# Patient Record
Sex: MALE | Race: WHITE | NOT HISPANIC OR LATINO | Employment: OTHER | URBAN - NONMETROPOLITAN AREA
[De-identification: names, ages, dates, MRNs, and addresses within clinical notes are randomized per-mention and may not be internally consistent; named-entity substitution may affect disease eponyms.]

---

## 2017-01-04 ENCOUNTER — OFFICE VISIT (OUTPATIENT)
Dept: FAMILY MEDICINE CLINIC | Facility: CLINIC | Age: 62
End: 2017-01-04

## 2017-01-04 VITALS
OXYGEN SATURATION: 96 % | DIASTOLIC BLOOD PRESSURE: 68 MMHG | HEART RATE: 101 BPM | WEIGHT: 243 LBS | SYSTOLIC BLOOD PRESSURE: 128 MMHG | HEIGHT: 69 IN | BODY MASS INDEX: 35.99 KG/M2

## 2017-01-04 DIAGNOSIS — J30.2 SEASONAL ALLERGIC RHINITIS, UNSPECIFIED ALLERGIC RHINITIS TRIGGER: ICD-10-CM

## 2017-01-04 DIAGNOSIS — F41.9 ANXIETY: Primary | ICD-10-CM

## 2017-01-04 PROCEDURE — 99214 OFFICE O/P EST MOD 30 MIN: CPT | Performed by: FAMILY MEDICINE

## 2017-01-04 RX ORDER — CITALOPRAM 20 MG/1
20 TABLET ORAL DAILY
Qty: 90 TABLET | Refills: 3 | Status: SHIPPED | OUTPATIENT
Start: 2017-01-04

## 2017-01-04 NOTE — MR AVS SNAPSHOT
Bryce Stiles   1/4/2017 11:00 AM   Office Visit    Dept Phone:  [phone]   Encounter #:  [id number]    Provider:  Samson Llanes MD   Department:  [facility] FAMILY MEDICINE                Your Full Care Plan              Today's Medication Changes          These changes are accurate as of: 1/4/17 11:51 AM.  If you have any questions, ask your nurse or doctor.               Medication(s)that have changed:     citalopram 20 MG tablet   Commonly known as:  CELEXA   Take 1 tablet by mouth Daily.   What changed:    - medication strength  - how much to take   Changed by:  Samson Llanes MD            Where to Get Your Medications      You can get these medications from any pharmacy     Bring a paper prescription for each of these medications     citalopram 20 MG tablet                  Your Updated Medication List          This list is accurate as of: 1/4/17 11:51 AM.  Always use your most recent med list.                albuterol 108 (90 BASE) MCG/ACT inhaler   Commonly known as:  PROVENTIL HFA;VENTOLIN HFA       amLODIPine 5 MG tablet   Commonly known as:  NORVASC       citalopram 20 MG tablet   Commonly known as:  CELEXA   Take 1 tablet by mouth Daily.       fexofenadine 180 MG tablet   Commonly known as:  ALLEGRA       fluticasone-salmeterol 250-50 MCG/DOSE DISKUS   Commonly known as:  ADVAIR       glimepiride 4 MG tablet   Commonly known as:  AMARYL       lisinopril-hydrochlorothiazide 20-12.5 MG per tablet   Commonly known as:  PRINZIDE,ZESTORETIC       metFORMIN 1000 MG tablet   Commonly known as:  GLUCOPHAGE   Take 1 tablet by mouth 2 (two) times a day with meals.       Triamcinolone Acetonide 55 MCG/ACT nasal inhaler   Commonly known as:  NASACORT               You Were Diagnosed With        Codes Comments    Anxiety    -  Primary ICD-10-CM: F41.9  ICD-9-CM: 300.00     Seasonal allergic rhinitis, unspecified allergic rhinitis trigger     ICD-10-CM:  "J30.2  ICD-9-CM: 477.9       Instructions     None    Patient Instructions History      Upcoming Appointments     Visit Type Date Time Department    OFFICE VISIT 1/4/2017 11:00 AM [facility]    PHYSICAL 1/30/2017  8:30 AM [facility]      [facility] Signup     Our records indicate that you have declined [facility] signup. If you would like to sign up for [employer], please email [email or web address] or call [phone] to obtain an activation code.             Other Info from Your Visit           Your Appointments     Jan 30, 2017  8:30 AM [facility]   Physical with Samson Llanes MD   [facility] FAMILY MEDICINE (--)    [address] [place] [id number] [phone]           Arrive 15 minutes prior to appointment.              Allergies     No Known Allergies      Reason for Visit     Anxiety discuss medication for anxiety      Vital Signs     Blood Pressure Pulse Height Weight Oxygen Saturation Body Mass Index    128/68 (BP Location: Left arm, Patient Position: Sitting) 101 69\" (175.3 cm) 243 lb (110 kg) 96% 35.88 kg/m2    Smoking Status                   Former Smoker           Problems and Diagnoses Noted     Anxiety problem    -  Primary    Seasonal allergic reaction            "

## 2017-01-04 NOTE — PROGRESS NOTES
Subjective   Bryce Stiles is a 61 y.o. male.     History of Present Illness   61-year-old has been retired for 2 years.  Having trouble just feeling anxious and unsettled.  If he has activities planned for the day he does fine.  He has a hobby of shooting firearms which she enjoys.  He doesn't do this all the time.  In the summer he may have a garden.  Has taken citalopram which is helped in the past.  Has used doses as low as 5 mg.  His current prescriptions were 10 mg.    Allergic rhinitis/asthma.  Long history.  Has taken fexofenadine.  Lately though has substituted Loratadine as well as Sudafed.  The following portions of the patient's history were reviewed and updated as appropriate: allergies, current medications, past family history, past medical history, past social history, past surgical history and problem list.    Review of Systems   HENT: Positive for congestion, rhinorrhea, sinus pressure and sneezing.    Eyes: Negative for discharge.   Respiratory: Negative for cough, shortness of breath and wheezing.    Psychiatric/Behavioral: Positive for agitation and dysphoric mood. Negative for behavioral problems, confusion and decreased concentration. The patient is nervous/anxious.        Objective   Physical Exam   Constitutional: He is oriented to person, place, and time. He appears well-nourished.   HENT:   Head: Normocephalic.   Eyes: EOM are normal.   Pulmonary/Chest: Effort normal.   Neurological: He is alert and oriented to person, place, and time.   Psychiatric: He has a normal mood and affect. His behavior is normal. Judgment and thought content normal.       Assessment/Plan   Bryce was seen today for anxiety.    Diagnoses and all orders for this visit:    Anxiety  -     citalopram (CELEXA) 20 MG tablet; Take 1 tablet by mouth Daily.    Seasonal allergic rhinitis, unspecified allergic rhinitis trigger      Anxiety.  Bryce is retired.  He's having problems with not having a planned day.  He is  used to working every day.  His mood is fine.  He's not withdrawn.  In the past citalopram at a low dose has helped.  For now we'll going to go from 10 mg to 20.  He is a follow-up physical exam in a few weeks.  If he has an activity for that day he does much better.  He enjoys shooting pistols, rifles, shotgun.  But he can't do this all the time.    Seasonal allergies.  Has been taking generic Allegra but it hasn't been helping.  Has switched to ranitidine which is helped.  Backing some up with Sudafed.  It's okay for him to switch to Claritin.  He may want to go to Tylenol Sinus rather than Sudafed.  Tylenol Sinus has phenylephrine.

## 2017-01-30 ENCOUNTER — OFFICE VISIT (OUTPATIENT)
Dept: FAMILY MEDICINE CLINIC | Facility: CLINIC | Age: 62
End: 2017-01-30

## 2017-01-30 VITALS
BODY MASS INDEX: 35.25 KG/M2 | SYSTOLIC BLOOD PRESSURE: 130 MMHG | DIASTOLIC BLOOD PRESSURE: 70 MMHG | OXYGEN SATURATION: 95 % | HEART RATE: 90 BPM | WEIGHT: 238 LBS | HEIGHT: 69 IN

## 2017-01-30 DIAGNOSIS — Z23 NEED FOR VACCINATION: ICD-10-CM

## 2017-01-30 DIAGNOSIS — IMO0001 UNCONTROLLED TYPE 2 DIABETES MELLITUS WITHOUT COMPLICATION, WITHOUT LONG-TERM CURRENT USE OF INSULIN: ICD-10-CM

## 2017-01-30 DIAGNOSIS — E78.5 HYPERLIPIDEMIA, UNSPECIFIED HYPERLIPIDEMIA TYPE: ICD-10-CM

## 2017-01-30 DIAGNOSIS — Z00.00 ANNUAL PHYSICAL EXAM: Primary | ICD-10-CM

## 2017-01-30 DIAGNOSIS — I10 ESSENTIAL HYPERTENSION: ICD-10-CM

## 2017-01-30 DIAGNOSIS — J45.20 MILD INTERMITTENT ASTHMA WITHOUT COMPLICATION: ICD-10-CM

## 2017-01-30 DIAGNOSIS — E66.09 OBESITY DUE TO EXCESS CALORIES, UNSPECIFIED OBESITY SEVERITY: ICD-10-CM

## 2017-01-30 DIAGNOSIS — Z12.5 SCREENING FOR PROSTATE CANCER: ICD-10-CM

## 2017-01-30 DIAGNOSIS — L30.9 DERMATITIS: ICD-10-CM

## 2017-01-30 PROCEDURE — 36415 COLL VENOUS BLD VENIPUNCTURE: CPT | Performed by: FAMILY MEDICINE

## 2017-01-30 PROCEDURE — 90715 TDAP VACCINE 7 YRS/> IM: CPT | Performed by: FAMILY MEDICINE

## 2017-01-30 PROCEDURE — 99396 PREV VISIT EST AGE 40-64: CPT | Performed by: FAMILY MEDICINE

## 2017-01-30 PROCEDURE — 90471 IMMUNIZATION ADMIN: CPT | Performed by: FAMILY MEDICINE

## 2017-01-30 RX ORDER — LORATADINE 10 MG/1
CAPSULE, LIQUID FILLED ORAL
COMMUNITY
End: 2017-08-03

## 2017-01-30 RX ORDER — AMLODIPINE BESYLATE 5 MG/1
5 TABLET ORAL DAILY
Qty: 90 TABLET | Refills: 3 | Status: SHIPPED | OUTPATIENT
Start: 2017-01-30

## 2017-01-30 RX ORDER — ALBUTEROL SULFATE 90 UG/1
2 AEROSOL, METERED RESPIRATORY (INHALATION) EVERY 4 HOURS PRN
Qty: 3 INHALER | Refills: 3 | Status: SHIPPED | OUTPATIENT
Start: 2017-01-30

## 2017-01-30 RX ORDER — GLIMEPIRIDE 4 MG/1
4 TABLET ORAL
Qty: 90 TABLET | Refills: 3 | Status: SHIPPED | OUTPATIENT
Start: 2017-01-30

## 2017-01-30 RX ORDER — LISINOPRIL AND HYDROCHLOROTHIAZIDE 20; 12.5 MG/1; MG/1
1 TABLET ORAL DAILY
Qty: 90 TABLET | Refills: 3 | Status: SHIPPED | OUTPATIENT
Start: 2017-01-30

## 2017-01-30 NOTE — MR AVS SNAPSHOT
Bryce Stiles   1/30/2017 8:30 AM   Office Visit    Dept Phone:  [phone]   Encounter #:  [id number]    Provider:  Samson Llanes MD   Department:  [facility] FAMILY MEDICINE                Your Full Care Plan              Today's Medication Changes          These changes are accurate as of: 1/30/17  9:34 AM.  If you have any questions, ask your nurse or doctor.               New Medication(s)Ordered:     fluocinonide-emollient 0.05 % cream   Commonly known as:  LIDEX-E   Apply  topically 2 (Two) Times a Day.   Started by:  Samson Llanes MD         Medication(s)that have changed:     albuterol 108 (90 BASE) MCG/ACT inhaler   Commonly known as:  PROVENTIL HFA;VENTOLIN HFA   Inhale 2 puffs Every 4 (Four) Hours As Needed (for wheezing).   What changed:  reasons to take this   Changed by:  Samson Llanes MD       fluticasone-salmeterol 250-50 MCG/DOSE DISKUS   Commonly known as:  ADVAIR   Inhale 1 puff 2 (Two) Times a Day.   What changed:  how much to take   Changed by:  Samson Llanes MD            Where to Get Your Medications      These medications were sent to [address] - [phone] [place] [phone]   [address] [facility] [address] [place] [address]     Phone:  [phone]     lisinopril-hydrochlorothiazide 20-12.5 MG per tablet    metFORMIN 1000 MG tablet         You can get these medications from any pharmacy     Bring a paper prescription for each of these medications     albuterol 108 (90 BASE) MCG/ACT inhaler    amLODIPine 5 MG tablet    fluocinonide-emollient 0.05 % cream    fluticasone-salmeterol 250-50 MCG/DOSE DISKUS    glimepiride 4 MG tablet                  Your Updated Medication List          This list is accurate as of: 1/30/17  9:34 AM.  Always use your most recent med list.                albuterol 108 (90 BASE) MCG/ACT inhaler   Commonly known as:  PROVENTIL  HFA;VENTOLIN HFA   Inhale 2 puffs Every 4 (Four) Hours As Needed (for wheezing).       amLODIPine 5 MG tablet   Commonly known as:  NORVASC   Take 1 tablet by mouth Daily.       citalopram 20 MG tablet   Commonly known as:  CELEXA   Take 1 tablet by mouth Daily.       fexofenadine 180 MG tablet   Commonly known as:  ALLEGRA       fluocinonide-emollient 0.05 % cream   Commonly known as:  LIDEX-E   Apply  topically 2 (Two) Times a Day.       fluticasone-salmeterol 250-50 MCG/DOSE DISKUS   Commonly known as:  ADVAIR   Inhale 1 puff 2 (Two) Times a Day.       glimepiride 4 MG tablet   Commonly known as:  AMARYL   Take 1 tablet by mouth Every Morning Before Breakfast.       lisinopril-hydrochlorothiazide 20-12.5 MG per tablet   Commonly known as:  PRINZIDE,ZESTORETIC   Take 1 tablet by mouth Daily.       Loratadine 10 MG capsule       metFORMIN 1000 MG tablet   Commonly known as:  GLUCOPHAGE   Take 1 tablet by mouth 2 (Two) Times a Day With Meals.       Triamcinolone Acetonide 55 MCG/ACT nasal inhaler   Commonly known as:  NASACORT               We Performed the Following     CBC & Differential     Comprehensive Metabolic Panel     Hemoglobin A1c     Hepatitis C Antibody     Lipid Panel     POC Microalbumin     PSA     Tdap Vaccine Greater Than or Equal To 6yo IM     TSH       You Were Diagnosed With        Codes Comments    Annual physical exam    -  Primary ICD-10-CM: Z00.00  ICD-9-CM: V70.0     Essential hypertension     ICD-10-CM: I10  ICD-9-CM: 401.9     Hyperlipidemia, unspecified hyperlipidemia type     ICD-10-CM: E78.5  ICD-9-CM: 272.4     Mild intermittent asthma without complication     ICD-10-CM: J45.20  ICD-9-CM: 493.90     Obesity due to excess calories, unspecified obesity severity     ICD-10-CM: E66.09  ICD-9-CM: 278.00     Uncontrolled type 2 diabetes mellitus without complication, without long-term current use of insulin     ICD-10-CM: E11.65  ICD-9-CM: 250.02     Dermatitis     ICD-10-CM:  "L30.9  ICD-9-CM: 692.9       Instructions     None    Patient Instructions History      Upcoming Appointments     Visit Type Date Time Department    PHYSICAL 1/30/2017  8:30 AM [facility]      [employer] Signup     Our records indicate that you have declined [email or web address] signup. If you would like to sign up for [employer], please email [email or web address] or call [phone] to obtain an activation code.             Other Info from Your Visit           Allergies     No Known Allergies      Reason for Visit     Annual Exam           Vital Signs     Blood Pressure Pulse Height Weight Oxygen Saturation Body Mass Index    130/70 (BP Location: Left arm, Patient Position: Sitting) 90 69\" (175.3 cm) 238 lb (108 kg) 95% 35.15 kg/m2    Smoking Status                   Former Smoker           Problems and Diagnoses Noted     Asthma    High blood pressure    High cholesterol or triglycerides    Obesity    Uncontrolled diabetes mellitus without complication    Annual physical exam    -  Primary    Dermatitis          Immunizations Administered     Name Date    Tdap         "

## 2017-01-30 NOTE — PROGRESS NOTES
Subjective   Bryce Stiles is a 61 y.o. male.     History of Present Illness   61-year-old male.  Here for annual exam.  Has a history of hypertension, diabetes, hyperlipidemia, asthma.  The following portions of the patient's history were reviewed and updated as appropriate: allergies, current medications, past family history, past medical history, past social history, past surgical history and problem list.    Review of Systems   Constitutional: Negative for appetite change, fatigue, fever and unexpected weight change.   HENT: Negative for congestion, dental problem, ear discharge, ear pain, hearing loss, postnasal drip, rhinorrhea, sneezing, sore throat, trouble swallowing and voice change.    Eyes: Negative for discharge, redness, itching and visual disturbance.   Respiratory: Negative for apnea, cough, shortness of breath and wheezing.    Cardiovascular: Negative for chest pain, palpitations and leg swelling.   Gastrointestinal: Negative for abdominal distention, abdominal pain, blood in stool, constipation, diarrhea, nausea and vomiting.   Endocrine: Negative for cold intolerance, heat intolerance, polydipsia, polyphagia and polyuria.   Genitourinary: Negative for dysuria, frequency, hematuria and urgency.   Musculoskeletal: Negative for arthralgias, back pain, gait problem, joint swelling, myalgias and neck pain.   Skin: Positive for rash (back of scalp).   Allergic/Immunologic: Positive for environmental allergies. Negative for food allergies.   Neurological: Negative for dizziness, syncope, weakness, light-headedness and headaches.   Hematological: Negative for adenopathy. Does not bruise/bleed easily.   Psychiatric/Behavioral: Negative for decreased concentration, dysphoric mood and sleep disturbance. The patient is nervous/anxious.        Objective   Physical Exam   Constitutional: He is oriented to person, place, and time. He appears well-developed and well-nourished.   HENT:   Right Ear: External ear  normal.   Left Ear: External ear normal.   Nose: Nose normal.   Mouth/Throat: Oropharynx is clear and moist.   Eyes: Conjunctivae and EOM are normal.   Neck: Normal range of motion. Neck supple. No thyromegaly present.   Cardiovascular: Normal rate, regular rhythm, normal heart sounds and intact distal pulses.    No murmur heard.  Pulmonary/Chest: Effort normal and breath sounds normal.   Abdominal: Soft. Bowel sounds are normal. He exhibits no mass. There is no tenderness.   Musculoskeletal: Normal range of motion. He exhibits no edema.      During the foot exam he had a monofilament test performed.    Neurological Sensory Findings -  Unaltered sharp/dull right ankle/foot discrimination and unaltered sharp/dull left ankle/foot discrimination.    Vascular Status -  His exam exhibits right foot vasculature normal. His exam exhibits no right foot edema. His exam exhibits left foot vasculature normal. His exam exhibits no left foot edema.  Lymphadenopathy:     He has no cervical adenopathy.     He has no axillary adenopathy.        Right: No inguinal and no supraclavicular adenopathy present.        Left: No inguinal and no supraclavicular adenopathy present.   Neurological: He is alert and oriented to person, place, and time. Coordination normal.   Skin: Skin is warm and dry. Purpura and rash noted. There is erythema. No pallor.        Psychiatric: He has a normal mood and affect. His behavior is normal. Thought content normal.   Vitals reviewed.      Assessment/Plan   Bryce was seen today for annual exam.    Diagnoses and all orders for this visit:    Annual physical exam  -     PSA  -     Hepatitis C Antibody  -     Tdap Vaccine Greater Than or Equal To 6yo IM    Essential hypertension  -     CBC & Differential  -     Comprehensive Metabolic Panel  -     TSH    Hyperlipidemia, unspecified hyperlipidemia type  -     Lipid Panel    Mild intermittent asthma without complication    Obesity due to excess calories,  unspecified obesity severity    Uncontrolled type 2 diabetes mellitus without complication, without long-term current use of insulin  -     POC Microalbumin  -     Hemoglobin A1c  -     metFORMIN (GLUCOPHAGE) 1000 MG tablet; Take 1 tablet by mouth 2 (Two) Times a Day With Meals.    Dermatitis    Other orders  -     amLODIPine (NORVASC) 5 MG tablet; Take 1 tablet by mouth Daily.  -     glimepiride (AMARYL) 4 MG tablet; Take 1 tablet by mouth Every Morning Before Breakfast.  -     lisinopril-hydrochlorothiazide (PRINZIDE,ZESTORETIC) 20-12.5 MG per tablet; Take 1 tablet by mouth Daily.  -     fluticasone-salmeterol (ADVAIR) 250-50 MCG/DOSE DISKUS; Inhale 1 puff 2 (Two) Times a Day.  -     albuterol (PROVENTIL HFA;VENTOLIN HFA) 108 (90 BASE) MCG/ACT inhaler; Inhale 2 puffs Every 4 (Four) Hours As Needed (for wheezing).  -     fluocinonide-emollient (LIDEX-E) 0.05 % cream; Apply  topically 2 (Two) Times a Day.      Annual exam.  Up-to-date on colonoscopy.  Has had 2 pneumonia vaccines, shingles vaccine.  We'll get T dap today.  No flu.    Diabetes.  We'll check appropriate labs.  No sign of diabetic neuropathy.  Good pulses in his feet.  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  He has lost 5 pounds.  He would like to get rid of some medicines if he possibly could.  Gets yearly eye exam to the [facility].  States his last exam was June 2016    Hypertension stable    Hyperlipidemia will reevaluate.  Not taking a statin.    Asthma.  Seems to be doing well    Dermatitis.  Nonspecific rash on the back of the scalp .  We'll try some fluocinonide to see if it'll clear it up.

## 2017-01-31 LAB
ALBUMIN SERPL-MCNC: 4.7 G/DL (ref 3.6–4.8)
ALBUMIN/GLOB SERPL: 1.8 {RATIO} (ref 1.1–2.5)
ALP SERPL-CCNC: 64 IU/L (ref 39–117)
ALT SERPL-CCNC: 20 IU/L (ref 0–44)
AST SERPL-CCNC: 13 IU/L (ref 0–40)
BASOPHILS # BLD AUTO: 0.1 X10E3/UL (ref 0–0.2)
BASOPHILS NFR BLD AUTO: 0 %
BILIRUB SERPL-MCNC: 0.2 MG/DL (ref 0–1.2)
BUN SERPL-MCNC: 22 MG/DL (ref 8–27)
BUN/CREAT SERPL: 15 (ref 10–22)
CALCIUM SERPL-MCNC: 9.5 MG/DL (ref 8.6–10.2)
CHLORIDE SERPL-SCNC: 95 MMOL/L (ref 96–106)
CHOLEST SERPL-MCNC: 177 MG/DL (ref 100–199)
CO2 SERPL-SCNC: 24 MMOL/L (ref 18–29)
CREAT SERPL-MCNC: 1.47 MG/DL (ref 0.76–1.27)
EOSINOPHIL # BLD AUTO: 0.6 X10E3/UL (ref 0–0.4)
EOSINOPHIL NFR BLD AUTO: 5 %
ERYTHROCYTE [DISTWIDTH] IN BLOOD BY AUTOMATED COUNT: 13.9 % (ref 12.3–15.4)
GLOBULIN SER CALC-MCNC: 2.6 G/DL (ref 1.5–4.5)
GLUCOSE SERPL-MCNC: 98 MG/DL (ref 65–99)
HBA1C MFR BLD: 6.9 % (ref 4.8–5.6)
HCT VFR BLD AUTO: 41.4 % (ref 37.5–51)
HCV AB S/CO SERPL IA: <0.1 S/CO RATIO (ref 0–0.9)
HDLC SERPL-MCNC: 38 MG/DL
HGB BLD-MCNC: 13.3 G/DL (ref 12.6–17.7)
IMM GRANULOCYTES # BLD: 0 X10E3/UL (ref 0–0.1)
IMM GRANULOCYTES NFR BLD: 0 %
LDLC SERPL CALC-MCNC: 114 MG/DL (ref 0–99)
LYMPHOCYTES # BLD AUTO: 3.5 X10E3/UL (ref 0.7–3.1)
LYMPHOCYTES NFR BLD AUTO: 28 %
MCH RBC QN AUTO: 26.3 PG (ref 26.6–33)
MCHC RBC AUTO-ENTMCNC: 32.1 G/DL (ref 31.5–35.7)
MCV RBC AUTO: 82 FL (ref 79–97)
MONOCYTES # BLD AUTO: 0.9 X10E3/UL (ref 0.1–0.9)
MONOCYTES NFR BLD AUTO: 7 %
NEUTROPHILS # BLD AUTO: 7.4 X10E3/UL (ref 1.4–7)
NEUTROPHILS NFR BLD AUTO: 60 %
PLATELET # BLD AUTO: 389 X10E3/UL (ref 150–379)
POTASSIUM SERPL-SCNC: 5.5 MMOL/L (ref 3.5–5.2)
PROT SERPL-MCNC: 7.3 G/DL (ref 6–8.5)
PSA SERPL-MCNC: 1 NG/ML (ref 0–4)
RBC # BLD AUTO: 5.05 X10E6/UL (ref 4.14–5.8)
SODIUM SERPL-SCNC: 140 MMOL/L (ref 134–144)
TRIGL SERPL-MCNC: 124 MG/DL (ref 0–149)
TSH SERPL DL<=0.005 MIU/L-ACNC: 7.47 UIU/ML (ref 0.45–4.5)
VLDLC SERPL CALC-MCNC: 25 MG/DL (ref 5–40)
WBC # BLD AUTO: 12.4 X10E3/UL (ref 3.4–10.8)

## 2017-01-31 NOTE — PROGRESS NOTES
Call.  Blood count is good.  Liver test looks fine.  1 kidney test is off a little.  Probably of no significance.  I will repeat later.  Cholesterol profile , overall looks good.  Still, I'm's pulse to recommend that any diabetic take a statin.  This is to lower cholesterol course.  In the past you did not want to take this.  A1c for diabetes is 6.9 which overall is good.  Thyroid test is a little borderline.  Overall I am pleased.  I would like to repeat some test in 3 months and then follow-up.  (Basic metabolic profile, TSH, A1c)

## 2017-02-01 ENCOUNTER — TELEPHONE (OUTPATIENT)
Dept: FAMILY MEDICINE CLINIC | Facility: CLINIC | Age: 62
End: 2017-02-01

## 2017-02-01 NOTE — TELEPHONE ENCOUNTER
----- Message from Samson Llanes MD sent at 1/31/2017 12:04 PM EST -----  Call.  Blood count is good.  Liver test looks fine.  1 kidney test is off a little.  Probably of no significance.  I will repeat later.  Cholesterol profile , overall looks good.  Still, I'm suposed to recommend that any diabetic take a statin.  This is to lower cholesterol .  In the past you did not want to take this.  A1c for diabetes is 6.9 which overall is good.  Thyroid test is a little borderline.  Overall I am pleased.  I would like to repeat some test in 3 months and then follow-up.  (Basic metabolic profile, TSH, A1c)

## 2017-04-06 ENCOUNTER — OFFICE VISIT (OUTPATIENT)
Dept: FAMILY MEDICINE CLINIC | Facility: CLINIC | Age: 62
End: 2017-04-06

## 2017-04-06 VITALS
HEIGHT: 69 IN | HEART RATE: 79 BPM | SYSTOLIC BLOOD PRESSURE: 136 MMHG | OXYGEN SATURATION: 95 % | WEIGHT: 239 LBS | BODY MASS INDEX: 35.4 KG/M2 | DIASTOLIC BLOOD PRESSURE: 64 MMHG

## 2017-04-06 DIAGNOSIS — R19.7 DIARRHEA, UNSPECIFIED TYPE: Primary | ICD-10-CM

## 2017-04-06 DIAGNOSIS — K58.0 IRRITABLE BOWEL SYNDROME WITH DIARRHEA: ICD-10-CM

## 2017-04-06 PROCEDURE — 99213 OFFICE O/P EST LOW 20 MIN: CPT | Performed by: FAMILY MEDICINE

## 2017-04-06 NOTE — PROGRESS NOTES
Subjective   Bryce Stiles is a 62 y.o. male.     History of Present Illness   62-year-old male.  Loose stool for about a month.  Usually after a meal.  He is getting ready to go on a trip and he is concerned about this.  No fever.  No history of contaminated food and water etc.  Eights he had this in the past but I could not find it in the paper chart ran the old electronic records.  The following portions of the patient's history were reviewed and updated as appropriate: allergies, current medications, past family history, past medical history, past social history, past surgical history and problem list.    Review of Systems   Constitutional: Negative for chills, fatigue, fever and unexpected weight change.   Respiratory: Negative.    Gastrointestinal: Positive for diarrhea. Negative for abdominal distention, abdominal pain, constipation, nausea, rectal pain and vomiting.       Objective   Physical Exam   Constitutional: He is oriented to person, place, and time. He appears well-nourished.   HENT:   Head: Normocephalic.   Right Ear: External ear normal.   Left Ear: External ear normal.   Eyes: EOM are normal.   Pulmonary/Chest: Effort normal.   Musculoskeletal: Normal range of motion.   Neurological: He is alert and oriented to person, place, and time.   Skin: Skin is warm.   Psychiatric: He has a normal mood and affect.       Assessment/Plan   Bryce was seen today for diarrhea.    Diagnoses and all orders for this visit:    Diarrhea, unspecified type    Irritable bowel syndrome with diarrhea    Initially I was prepared to do blood work, stool studies.  The more we spoke, I felt it may be more of an irritable bowel situation.  Before initiating a bigger workup, he will buy a probiotic such as align, we'll call Levsin sublingual which he can take with meals or when he feels cramping or diarrhea starting.  We'll follow-up in 10 days.  No better then stool culture CBC etc.

## 2017-04-17 ENCOUNTER — OFFICE VISIT (OUTPATIENT)
Dept: FAMILY MEDICINE CLINIC | Facility: CLINIC | Age: 62
End: 2017-04-17

## 2017-04-17 VITALS
BODY MASS INDEX: 35.55 KG/M2 | DIASTOLIC BLOOD PRESSURE: 60 MMHG | HEART RATE: 89 BPM | WEIGHT: 240 LBS | HEIGHT: 69 IN | SYSTOLIC BLOOD PRESSURE: 130 MMHG | OXYGEN SATURATION: 94 %

## 2017-04-17 DIAGNOSIS — R19.7 DIARRHEA, UNSPECIFIED TYPE: Primary | ICD-10-CM

## 2017-04-17 DIAGNOSIS — K58.0 IRRITABLE BOWEL SYNDROME WITH DIARRHEA: ICD-10-CM

## 2017-04-17 PROCEDURE — 99213 OFFICE O/P EST LOW 20 MIN: CPT | Performed by: FAMILY MEDICINE

## 2017-04-17 NOTE — PROGRESS NOTES
Subjective   Bryce Stiles is a 62 y.o. male.     History of Present Illness   62-year-old male.  Here is a follow-up visit to diarrhea.  Was given generic Levsin sublingual.  Advised to get some probiotics.  Plan was to see if this helped before pursuing other expensive and lengthy workups.  States that he thinks he is doing better.  He's not sure if the probiotic reviewed the labs and has been the key.  He states that in retrospect he thinks he's had this all of his life.  If this is irritable bowel syndrome than that would make some sense.  The following portions of the patient's history were reviewed and updated as appropriate: allergies, current medications, past family history, past medical history, past social history, past surgical history and problem list.    Review of Systems   Constitutional: Negative for fever and unexpected weight change.   HENT: Negative.    Gastrointestinal: Positive for diarrhea. Negative for abdominal distention, abdominal pain, nausea and vomiting.       Objective   Physical Exam   Constitutional: He appears well-nourished.   Pulmonary/Chest: Effort normal.   Abdominal: Soft. He exhibits no distension and no mass. Bowel sounds are increased. There is no tenderness.       Assessment/Plan   Bryce was seen today for diarrhea.    Diagnoses and all orders for this visit:    Diarrhea, unspecified type    Irritable bowel syndrome with diarrhea    Diarrhea is improved.  He will continue the probiotic.  Tried just to use the Levsin sublingual when necessary and really feeling needs it.  I do think that he probably has a form of irritable bowel syndrome.  He has an appointment in May for his diabetes.  We'll see how he is doing and decide if other workup or referral is needed.

## 2017-05-02 ENCOUNTER — OFFICE VISIT (OUTPATIENT)
Dept: FAMILY MEDICINE CLINIC | Facility: CLINIC | Age: 62
End: 2017-05-02

## 2017-05-02 VITALS
OXYGEN SATURATION: 95 % | HEART RATE: 80 BPM | DIASTOLIC BLOOD PRESSURE: 68 MMHG | WEIGHT: 242 LBS | SYSTOLIC BLOOD PRESSURE: 120 MMHG | HEIGHT: 69 IN | BODY MASS INDEX: 35.84 KG/M2

## 2017-05-02 DIAGNOSIS — R19.7 DIARRHEA, UNSPECIFIED TYPE: ICD-10-CM

## 2017-05-02 DIAGNOSIS — R79.89 ABNORMAL TSH: ICD-10-CM

## 2017-05-02 DIAGNOSIS — I10 ESSENTIAL HYPERTENSION: ICD-10-CM

## 2017-05-02 DIAGNOSIS — IMO0001 UNCONTROLLED TYPE 2 DIABETES MELLITUS WITHOUT COMPLICATION, WITHOUT LONG-TERM CURRENT USE OF INSULIN: Primary | ICD-10-CM

## 2017-05-02 LAB — HBA1C MFR BLD: 6.5 %

## 2017-05-02 PROCEDURE — 36415 COLL VENOUS BLD VENIPUNCTURE: CPT | Performed by: FAMILY MEDICINE

## 2017-05-02 PROCEDURE — 83036 HEMOGLOBIN GLYCOSYLATED A1C: CPT | Performed by: FAMILY MEDICINE

## 2017-05-02 PROCEDURE — 99214 OFFICE O/P EST MOD 30 MIN: CPT | Performed by: FAMILY MEDICINE

## 2017-05-03 ENCOUNTER — TELEPHONE (OUTPATIENT)
Dept: FAMILY MEDICINE CLINIC | Facility: CLINIC | Age: 62
End: 2017-05-03

## 2017-05-03 LAB
BUN SERPL-MCNC: 23 MG/DL (ref 8–27)
BUN/CREAT SERPL: 18 (ref 10–24)
CALCIUM SERPL-MCNC: 9.6 MG/DL (ref 8.6–10.2)
CHLORIDE SERPL-SCNC: 96 MMOL/L (ref 96–106)
CO2 SERPL-SCNC: 24 MMOL/L (ref 18–29)
CREAT SERPL-MCNC: 1.31 MG/DL (ref 0.76–1.27)
GLUCOSE SERPL-MCNC: 112 MG/DL (ref 65–99)
POTASSIUM SERPL-SCNC: 5.1 MMOL/L (ref 3.5–5.2)
SODIUM SERPL-SCNC: 137 MMOL/L (ref 134–144)
TSH SERPL DL<=0.005 MIU/L-ACNC: 3.71 UIU/ML (ref 0.45–4.5)

## 2017-08-03 ENCOUNTER — OFFICE VISIT (OUTPATIENT)
Dept: FAMILY MEDICINE CLINIC | Facility: CLINIC | Age: 62
End: 2017-08-03

## 2017-08-03 VITALS
BODY MASS INDEX: 35.99 KG/M2 | OXYGEN SATURATION: 93 % | DIASTOLIC BLOOD PRESSURE: 70 MMHG | HEART RATE: 84 BPM | WEIGHT: 243 LBS | HEIGHT: 69 IN | SYSTOLIC BLOOD PRESSURE: 122 MMHG

## 2017-08-03 DIAGNOSIS — IMO0001 UNCONTROLLED TYPE 2 DIABETES MELLITUS WITHOUT COMPLICATION, WITHOUT LONG-TERM CURRENT USE OF INSULIN: Primary | ICD-10-CM

## 2017-08-03 DIAGNOSIS — J30.9 CHRONIC ALLERGIC RHINITIS: ICD-10-CM

## 2017-08-03 LAB — HBA1C MFR BLD: 6.5 %

## 2017-08-03 PROCEDURE — 83036 HEMOGLOBIN GLYCOSYLATED A1C: CPT | Performed by: FAMILY MEDICINE

## 2017-08-03 PROCEDURE — 99213 OFFICE O/P EST LOW 20 MIN: CPT | Performed by: FAMILY MEDICINE

## 2017-08-03 RX ORDER — FEXOFENADINE HCL 180 MG/1
180 TABLET ORAL DAILY
Qty: 90 TABLET | Refills: 4 | Status: SHIPPED | OUTPATIENT
Start: 2017-08-03 | End: 2018-04-24

## 2017-08-03 NOTE — PROGRESS NOTES
Subjective   Bryce Stiles is a 62 y.o. male.     History of Present Illness   62-year-old male, diabetic, on oral medicines only.  A1c in May was 6.5.    Allergic rhinitis.  Needs a refill of his antihistamine  The following portions of the patient's history were reviewed and updated as appropriate: allergies, current medications, past family history, past medical history, past social history, past surgical history and problem list.    Review of Systems   Constitutional: Negative for chills, fatigue and unexpected weight change.       Objective   Physical Exam   Constitutional: He is oriented to person, place, and time.   Obese   HENT:   Right Ear: External ear normal.   Left Ear: External ear normal.   Eyes: EOM are normal.   Neck: Neck supple.   Cardiovascular: Normal rate, regular rhythm and normal heart sounds.    Pulmonary/Chest: Effort normal.   Neurological: He is alert and oriented to person, place, and time.   Vitals reviewed.      Assessment/Plan   Bryce was seen today for diabetes and med refill.    Diagnoses and all orders for this visit:    Uncontrolled type 2 diabetes mellitus without complication, without long-term current use of insulin  -     POC Glycosylated Hemoglobin (Hb A1C)    Chronic allergic rhinitis  -     fexofenadine (ALLEGRA) 180 MG tablet; Take 1 tablet by mouth Daily.    Diabetes.  A1c is still 6.5.  Overall I'm very pleased.  Still encouraged him to lose weight.    Allergic rhinitis.  Stable.  Refills.

## 2018-04-24 ENCOUNTER — CONSULT (OUTPATIENT)
Dept: SLEEP MEDICINE | Facility: HOSPITAL | Age: 63
End: 2018-04-24

## 2018-04-24 VITALS
WEIGHT: 251 LBS | HEART RATE: 88 BPM | DIASTOLIC BLOOD PRESSURE: 80 MMHG | SYSTOLIC BLOOD PRESSURE: 142 MMHG | BODY MASS INDEX: 37.18 KG/M2 | OXYGEN SATURATION: 91 % | HEIGHT: 69 IN

## 2018-04-24 DIAGNOSIS — G47.33 OBSTRUCTIVE SLEEP APNEA, ADULT: ICD-10-CM

## 2018-04-24 DIAGNOSIS — R06.83 SNORING: Primary | ICD-10-CM

## 2018-04-24 DIAGNOSIS — IMO0001 CLASS 2 OBESITY DUE TO EXCESS CALORIES WITH SERIOUS COMORBIDITY AND BODY MASS INDEX (BMI) OF 37.0 TO 37.9 IN ADULT: ICD-10-CM

## 2018-04-24 PROCEDURE — 99204 OFFICE O/P NEW MOD 45 MIN: CPT | Performed by: INTERNAL MEDICINE

## 2018-04-24 RX ORDER — ASPIRIN 81 MG/1
81 TABLET ORAL DAILY
COMMUNITY
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
## 2018-04-24 NOTE — PATIENT INSTRUCTIONS
Restless Legs Syndrome  Restless legs syndrome is a condition that causes uncomfortable feelings or sensations in the legs, especially while sitting or lying down. The sensations usually cause an overwhelming urge to move the legs. The arms can also sometimes be affected.  The condition can range from mild to severe. The symptoms often interfere with a person's ability to sleep.  What are the causes?  The cause of this condition is not known.  What increases the risk?  This condition is more likely to develop in:  · People who are older than age 50.  · Pregnant women. In general, restless legs syndrome is more common in women than in men.  · People who have a family history of the condition.  · People who have certain medical conditions, such as iron deficiency, kidney disease, Parkinson disease, or nerve damage.  · People who take certain medicines, such as medicines for high blood pressure, nausea, colds, allergies, depression, and some heart conditions.  What are the signs or symptoms?  The main symptom of this condition is uncomfortable sensations in the legs. These sensations may be:  · Described as pulling, tingling, prickling, throbbing, crawling, or burning.  · Worse while you are sitting or lying down.  · Worse during periods of rest or inactivity.  · Worse at night, often interfering with your sleep.  · Accompanied by a very strong urge to move your legs.  · Temporarily relieved by movement of your legs.  The sensations usually affect both sides of the body. The arms can also be affected, but this is rare. People who have this condition often have tiredness during the day because of their lack of sleep at night.  How is this diagnosed?  This condition may be diagnosed based on your description of the symptoms. You may also have tests, including blood tests, to check for other conditions that may lead to your symptoms. In some cases, you may be asked to spend some time in a sleep lab so your sleeping can be  monitored.  How is this treated?  Treatment for this condition is focused on managing the symptoms. Treatment may include:  · Self-help and lifestyle changes.  · Medicines.  Follow these instructions at home:  · Take medicines only as directed by your health care provider.  · Try these methods to get temporary relief from the uncomfortable sensations:  ¨ Massage your legs.  ¨ Walk or stretch.  ¨ Take a cold or hot bath.  · Practice good sleep habits. For example, go to bed and get up at the same time every day.  · Exercise regularly.  · Practice ways of relaxing, such as yoga or meditation.  · Avoid caffeine and alcohol.  · Do not use any tobacco products, including cigarettes, chewing tobacco, or electronic cigarettes. If you need help quitting, ask your health care provider.  · Keep all follow-up visits as directed by your health care provider. This is important.  Contact a health care provider if:  Your symptoms do not improve with treatment, or they get worse.  This information is not intended to replace advice given to you by your health care provider. Make sure you discuss any questions you have with your health care provider.  Document Released: 12/08/2003 Document Revised: 05/25/2017 Document Reviewed: 12/14/2015  [employer] Interactive Patient Education © 2017 [employer]  Sleep Apnea  Sleep apnea is a condition in which breathing pauses or becomes shallow during sleep. Episodes of sleep apnea usually last 10 seconds or longer, and they may occur as many as 20 times an hour. Sleep apnea disrupts your sleep and keeps your body from getting the rest that it needs. This condition can increase your risk of certain health problems, including:  · Heart attack.  · Stroke.  · Obesity.  · Diabetes.  · Heart failure.  · Irregular heartbeat.  There are three kinds of sleep apnea:  · Obstructive sleep apnea. This kind is caused by a blocked or collapsed airway.  · Central sleep apnea. This kind happens when the part of  the brain that controls breathing does not send the correct signals to the muscles that control breathing.  · Mixed sleep apnea. This is a combination of obstructive and central sleep apnea.  What are the causes?  The most common cause of this condition is a collapsed or blocked airway. An airway can collapse or become blocked if:  · Your throat muscles are abnormally relaxed.  · Your tongue and tonsils are larger than normal.  · You are overweight.  · Your airway is smaller than normal.  What increases the risk?  This condition is more likely to develop in people who:  · Are overweight.  · Smoke.  · Have a smaller than normal airway.  · Are elderly.  · Are male.  · Drink alcohol.  · Take sedatives or tranquilizers.  · Have a family history of sleep apnea.  What are the signs or symptoms?  Symptoms of this condition include:  · Trouble staying asleep.  · Daytime sleepiness and tiredness.  · Irritability.  · Loud snoring.  · Morning headaches.  · Trouble concentrating.  · Forgetfulness.  · Decreased interest in sex.  · Unexplained sleepiness.  · Mood swings.  · Personality changes.  · Feelings of depression.  · Waking up often during the night to urinate.  · Dry mouth.  · Sore throat.  How is this diagnosed?  This condition may be diagnosed with:  · A medical history.  · A physical exam.  · A series of tests that are done while you are sleeping (sleep study). These tests are usually done in a sleep lab, but they may also be done at home.  How is this treated?  Treatment for this condition aims to restore normal breathing and to ease symptoms during sleep. It may involve managing health issues that can affect breathing, such as high blood pressure or obesity. Treatment may include:  · Sleeping on your side.  · Using a decongestant if you have nasal congestion.  · Avoiding the use of depressants, including alcohol, sedatives, and narcotics.  · Losing weight if you are overweight.  · Making changes to your  diet.  · Quitting smoking.  · Using a device to open your airway while you sleep, such as:  ¨ An oral appliance. This is a custom-made mouthpiece that shifts your lower jaw forward.  ¨ A continuous positive airway pressure (CPAP) device. This device delivers oxygen to your airway through a mask.  ¨ A nasal expiratory positive airway pressure (EPAP) device. This device has valves that you put into each nostril.  ¨ A bi-level positive airway pressure (BPAP) device. This device delivers oxygen to your airway through a mask.  · Surgery if other treatments do not work. During surgery, excess tissue is removed to create a wider airway.  It is important to get treatment for sleep apnea. Without treatment, this condition can lead to:  · High blood pressure.  · Coronary artery disease.  · (Men) An inability to achieve or maintain an erection (impotence).  · Reduced thinking abilities.  Follow these instructions at home:  · Make any lifestyle changes that your health care provider recommends.  · Eat a healthy, well-balanced diet.  · Take over-the-counter and prescription medicines only as told by your health care provider.  · Avoid using depressants, including alcohol, sedatives, and narcotics.  · Take steps to lose weight if you are overweight.  · If you were given a device to open your airway while you sleep, use it only as told by your health care provider.  · Do not use any tobacco products, such as cigarettes, chewing tobacco, and e-cigarettes. If you need help quitting, ask your health care provider.  · Keep all follow-up visits as told by your health care provider. This is important.  Contact a health care provider if:  · The device that you received to open your airway during sleep is uncomfortable or does not seem to be working.  · Your symptoms do not improve.  · Your symptoms get worse.  Get help right away if:  · You develop chest pain.  · You develop shortness of breath.  · You develop discomfort in your back,  arms, or stomach.  · You have trouble speaking.  · You have weakness on one side of your body.  · You have drooping in your face.  These symptoms may represent a serious problem that is an emergency. Do not wait to see if the symptoms will go away. Get medical help right away. Call your local emergency services (911 in the [place] Do not drive yourself to the hospital.  This information is not intended to replace advice given to you by your health care provider. Make sure you discuss any questions you have with your health care provider.  Document Released: 12/08/2003 Document Revised: 08/13/2017 Document Reviewed: 09/26/2016  [employer] Interactive Patient Education © 2017 [employer]

## 2018-05-07 ENCOUNTER — HOSPITAL ENCOUNTER (OUTPATIENT)
Dept: SLEEP MEDICINE | Facility: HOSPITAL | Age: 63
Discharge: HOME | End: 2018-05-07
Attending: INTERNAL MEDICINE | Admitting: INTERNAL MEDICINE

## 2018-05-07 VITALS
HEIGHT: 70 IN | WEIGHT: 249 LBS | OXYGEN SATURATION: 91 % | HEART RATE: 87 BPM | BODY MASS INDEX: 35.65 KG/M2 | DIASTOLIC BLOOD PRESSURE: 70 MMHG | SYSTOLIC BLOOD PRESSURE: 176 MMHG

## 2018-05-07 DIAGNOSIS — R06.83 SNORING: ICD-10-CM

## 2018-05-07 DIAGNOSIS — G47.33 OBSTRUCTIVE SLEEP APNEA, ADULT: ICD-10-CM

## 2018-05-07 PROCEDURE — 95811 POLYSOM 6/>YRS CPAP 4/> PARM: CPT

## 2018-05-07 PROCEDURE — 95811 POLYSOM 6/>YRS CPAP 4/> PARM: CPT | Performed by: INTERNAL MEDICINE

## 2018-05-08 DIAGNOSIS — I10 ESSENTIAL HYPERTENSION: ICD-10-CM

## 2018-05-08 DIAGNOSIS — R06.83 SNORING: ICD-10-CM

## 2018-05-08 DIAGNOSIS — G47.33 OBSTRUCTIVE SLEEP APNEA, ADULT: Primary | ICD-10-CM

## 2018-05-15 NOTE — PROGRESS NOTES
Patient would like to wait until his follow up appointment to talk about results and then get set up 5/15/2018

## 2018-05-25 ENCOUNTER — CONSULT (OUTPATIENT)
Dept: SLEEP MEDICINE | Facility: HOSPITAL | Age: 63
End: 2018-05-25

## 2018-05-25 VITALS
DIASTOLIC BLOOD PRESSURE: 70 MMHG | WEIGHT: 249 LBS | HEIGHT: 70 IN | OXYGEN SATURATION: 92 % | SYSTOLIC BLOOD PRESSURE: 154 MMHG | HEART RATE: 80 BPM | BODY MASS INDEX: 35.65 KG/M2

## 2018-05-25 DIAGNOSIS — IMO0001 CLASS 2 OBESITY DUE TO EXCESS CALORIES WITH SERIOUS COMORBIDITY AND BODY MASS INDEX (BMI) OF 35.0 TO 35.9 IN ADULT: ICD-10-CM

## 2018-05-25 DIAGNOSIS — G47.33 OBSTRUCTIVE SLEEP APNEA, ADULT: Primary | ICD-10-CM

## 2018-05-25 PROCEDURE — 99214 OFFICE O/P EST MOD 30 MIN: CPT | Performed by: INTERNAL MEDICINE

## 2018-05-27 NOTE — PROGRESS NOTES
Subjective   Bryce Stiles is a 63 y.o. male is here today for follow-up.  He is referred by Flor CERON.  He is also seen by Dr. Samson Llanes    History of Present Illness  Patient was last seen April 24 a history of snoring and nonrestorative sleep.  He had a history of COPD diabetes hypertension and obesity.  He had sleep study on May 7 and returns for results.  He thought his study night was fairly usual.  He denies any real changes in his sleep habits or in his health status since his last visit.  Past Medical History:   Diagnosis Date   • Asthma    • Chronic airway obstruction    • Colon cancer screening    • Depressive disorder    • Erectile dysfunction    • Essential hypertension    • Hoarseness    • Hyperlipidemia    • Keratosis seborrheica    • Obesity    • Overweight    • Renal failure    • Uncontrolled diabetes mellitus without complication        Past Surgical History:   Procedure Laterality Date   • HERNIA REPAIR             Current Outpatient Prescriptions:   •  albuterol (PROVENTIL HFA;VENTOLIN HFA) 108 (90 BASE) MCG/ACT inhaler, Inhale 2 puffs Every 4 (Four) Hours As Needed (for wheezing)., Disp: 3 inhaler, Rfl: 3  •  amLODIPine (NORVASC) 5 MG tablet, Take 1 tablet by mouth Daily., Disp: 90 tablet, Rfl: 3  •  aspirin 81 MG EC tablet, Take 81 mg by mouth Daily., Disp: , Rfl:   •  citalopram (CELEXA) 20 MG tablet, Take 1 tablet by mouth Daily., Disp: 90 tablet, Rfl: 3  •  fluocinonide-emollient (LIDEX-E) 0.05 % cream, Apply  topically 2 (Two) Times a Day., Disp: 60 g, Rfl: 2  •  fluticasone-salmeterol (ADVAIR) 250-50 MCG/DOSE DISKUS, Inhale 1 puff 2 (Two) Times a Day., Disp: 180 each, Rfl: 3  •  glimepiride (AMARYL) 4 MG tablet, Take 1 tablet by mouth Every Morning Before Breakfast., Disp: 90 tablet, Rfl: 3  •  lisinopril-hydrochlorothiazide (PRINZIDE,ZESTORETIC) 20-12.5 MG per tablet, Take 1 tablet by mouth Daily., Disp: 90 tablet, Rfl: 3  •  metFORMIN (GLUCOPHAGE) 1000 MG tablet,  "Take 1 tablet by mouth 2 (Two) Times a Day With Meals., Disp: 180 tablet, Rfl: 3  •  Triamcinolone Acetonide (NASACORT) 55 MCG/ACT nasal inhaler, 2 sprays into each nostril daily., Disp: , Rfl:     No Known Allergies    The following portions of the patient's history were reviewed and updated as appropriate: allergies, current medications and problem list.    Review of Systems   Constitutional: Negative.    HENT: Positive for hearing loss and sinus pressure.    Eyes: Negative.    Respiratory: Positive for cough, shortness of breath and wheezing.    Cardiovascular: Positive for leg swelling.   Gastrointestinal: Positive for diarrhea.   Endocrine: Positive for polydipsia and polyuria.   Genitourinary: Negative.    Musculoskeletal: Negative.    Skin: Negative.    Allergic/Immunologic: Negative.    Neurological: Negative.    Hematological: Negative.    Psychiatric/Behavioral: The patient is nervous/anxious.        Objective     /70   Pulse 80   Ht 177.8 cm (70\")   Wt 113 kg (249 lb)   SpO2 92%   BMI 35.73 kg/m²     Physical Exam   Constitutional: He is oriented to person, place, and time. He appears well-developed and well-nourished.   He is obese.   HENT:   Head: Normocephalic and atraumatic.   He has nasal airway narrowing and Mallampati class IV anatomy and mild retrognathia.   Eyes: EOM are normal. Pupils are equal, round, and reactive to light.   Neck: Normal range of motion. Neck supple.   Cardiovascular: Normal rate, regular rhythm and normal heart sounds.    Pulmonary/Chest: Effort normal. He has wheezes.   His a few expiratory wheezes bilaterally.   Abdominal: Soft. Bowel sounds are normal.   Musculoskeletal: Normal range of motion. He exhibits no edema.   Neurological: He is alert and oriented to person, place, and time.   Skin: Skin is warm and dry.   Psychiatric: He has a normal mood and affect. His behavior is normal.     Sleep study on May 7 showed overall severe obstructive sleep apnea with an " AHI of 101.4.  He had oxygen desaturations 78% and spent 16.4% of his total sleep time desaturated state.  He then had a titration study and titrated to BiPAP of 20/16 with a normal AHI.    Assessment/Plan   Severe obstructive sleep apnea     Significant nocturnal hypoxemia    Obesity    Patient presents with history as noted above.  He does have severe obstructive sleep apnea with significant nocturnal hypoxemia.  We've discussed possible therapies including positive airway pressure, weight control, oral appliances, and surgery.  He is willing to try bilevel.  I think he needs an auto bilevel with a maximum IPAP 25 minimum EPAP of 16 maximum pressure support of 8 minimum pressure support of 4.  He is to return in roughly 2 months we will reassess situation.  If he is tolerant of the bilevel we may need to consider an overnight oximetry to be certain were correcting nocturnal hypoxemia.  He is encouraged to lose weight.  He is encouraged to avoid alcohol and sedatives close to bedtime.  He is encouraged practice lateral position sleep.             Ulises Sumner MD [facility]  Sleep Medicine  Pulmonary and Critical Care Medicine      05/27/18  3:00 PM

## 2018-08-29 ENCOUNTER — OFFICE VISIT (OUTPATIENT)
Dept: SLEEP MEDICINE | Facility: HOSPITAL | Age: 63
End: 2018-08-29

## 2018-08-29 VITALS
HEART RATE: 80 BPM | OXYGEN SATURATION: 92 % | DIASTOLIC BLOOD PRESSURE: 60 MMHG | BODY MASS INDEX: 37.26 KG/M2 | WEIGHT: 251.6 LBS | HEIGHT: 69 IN | SYSTOLIC BLOOD PRESSURE: 140 MMHG

## 2018-08-29 DIAGNOSIS — G47.33 OSA (OBSTRUCTIVE SLEEP APNEA): Primary | ICD-10-CM

## 2018-08-29 PROCEDURE — 99213 OFFICE O/P EST LOW 20 MIN: CPT | Performed by: NURSE PRACTITIONER

## 2018-08-29 NOTE — PATIENT INSTRUCTIONS

## 2018-08-29 NOTE — PROGRESS NOTES
Subjective: Follow-up        Chief Complaint:   Chief Complaint   Patient presents with   • Follow-up       HPI:    Bryce Stiles is a 63 y.o. male here for follow-up of mana.  Patient states he is doing very well with apnea therapy.  Using his machine 88% of the time.  He is very happy with his settings.  He is resting approximately 5-8 hours nightly.  He does feel rested upon awakening.  He does not snore.  [place] scale 11/24.  Denies needing any changes to be made today.        Current medications are:   Current Outpatient Prescriptions:   •  albuterol (PROVENTIL HFA;VENTOLIN HFA) 108 (90 BASE) MCG/ACT inhaler, Inhale 2 puffs Every 4 (Four) Hours As Needed (for wheezing)., Disp: 3 inhaler, Rfl: 3  •  amLODIPine (NORVASC) 5 MG tablet, Take 1 tablet by mouth Daily., Disp: 90 tablet, Rfl: 3  •  aspirin 81 MG EC tablet, Take 81 mg by mouth Daily., Disp: , Rfl:   •  citalopram (CELEXA) 20 MG tablet, Take 1 tablet by mouth Daily., Disp: 90 tablet, Rfl: 3  •  fluocinonide-emollient (LIDEX-E) 0.05 % cream, Apply  topically 2 (Two) Times a Day., Disp: 60 g, Rfl: 2  •  fluticasone-salmeterol (ADVAIR) 250-50 MCG/DOSE DISKUS, Inhale 1 puff 2 (Two) Times a Day., Disp: 180 each, Rfl: 3  •  glimepiride (AMARYL) 4 MG tablet, Take 1 tablet by mouth Every Morning Before Breakfast., Disp: 90 tablet, Rfl: 3  •  lisinopril-hydrochlorothiazide (PRINZIDE,ZESTORETIC) 20-12.5 MG per tablet, Take 1 tablet by mouth Daily., Disp: 90 tablet, Rfl: 3  •  metFORMIN (GLUCOPHAGE) 1000 MG tablet, Take 1 tablet by mouth 2 (Two) Times a Day With Meals., Disp: 180 tablet, Rfl: 3  •  Triamcinolone Acetonide (NASACORT) 55 MCG/ACT nasal inhaler, 2 sprays into each nostril daily., Disp: , Rfl: .      The patient's relevant past medical, surgical, family and social history were reviewed and updated in Epic as appropriate.       Review of Systems   HENT: Positive for congestion.    Eyes: Positive for visual disturbance.   Respiratory: Positive for  apnea, shortness of breath and wheezing.    Cardiovascular: Positive for leg swelling.   Allergic/Immunologic: Positive for environmental allergies.   Psychiatric/Behavioral: Positive for dysphoric mood and sleep disturbance. The patient is nervous/anxious.    All other systems reviewed and are negative.        Objective:    Physical Exam   Constitutional: He is oriented to person, place, and time. He appears well-developed and well-nourished.   HENT:   Head: Normocephalic and atraumatic.   Mouth/Throat: Oropharynx is clear and moist.   Mallampati 4 anatomy   Eyes: Conjunctivae are normal.   Neck: Neck supple. No thyromegaly present.   Cardiovascular: Normal rate and regular rhythm.    Pulmonary/Chest: Effort normal and breath sounds normal.   Lymphadenopathy:     He has no cervical adenopathy.   Neurological: He is alert and oriented to person, place, and time.   Skin: Skin is warm and dry.   Psychiatric: He has a normal mood and affect. His behavior is normal. Judgment and thought content normal.   Nursing note and vitals reviewed.   greater than 4 hour usage 88%.  95th percentile setting 9.8.  AHI 1.6.  Maximum IPAP 25 7 m of water.  Minimum EPAP 16 7 cm of water.      ASSESSMENT/PLAN    Bryce was seen today for follow-up.    Diagnoses and all orders for this visit:    TIA (obstructive sleep apnea)  -     CPAP Therapy            1. Counseled patient regarding multimodal approach with healthy nutrition, healthy sleep, regular physical activity, social activities, counseling, and medications. Encouraged to practice lateral sleep position. Avoid alcohol and sedatives close to bedtime.  2. Refill supplies ×1 year.  3. Return to clinic one year or sooner as symptoms warrant.    I have reviewed the results of my evaluation and impression and discussed my recommendations in detail with the patient.      Signed by  JIE Casey    August 29, 2018      CC: Samson Llanes MD          No ref. provider  found

## 2019-08-29 ENCOUNTER — OFFICE VISIT (OUTPATIENT)
Dept: SLEEP MEDICINE | Facility: HOSPITAL | Age: 64
End: 2019-08-29

## 2019-08-29 VITALS
HEART RATE: 71 BPM | WEIGHT: 229.2 LBS | OXYGEN SATURATION: 97 % | SYSTOLIC BLOOD PRESSURE: 147 MMHG | HEIGHT: 69 IN | DIASTOLIC BLOOD PRESSURE: 64 MMHG | BODY MASS INDEX: 33.95 KG/M2

## 2019-08-29 DIAGNOSIS — G47.34 NOCTURNAL HYPOXEMIA: Primary | ICD-10-CM

## 2019-08-29 DIAGNOSIS — G47.33 OSA (OBSTRUCTIVE SLEEP APNEA): ICD-10-CM

## 2019-08-29 PROCEDURE — 99212 OFFICE O/P EST SF 10 MIN: CPT | Performed by: NURSE PRACTITIONER

## 2019-08-29 NOTE — PROGRESS NOTES
Chief Complaint:   Chief Complaint   Patient presents with   • Follow-up       HPI:    Bryce Stiles is a 64 y.o. male here for follow-up of sleep apnea.  Patient was last seen 8/29/2018.  Patient is on auto bilevel for severe sleep apnea.  Patient was also shown to have nocturnal hypoxemia on his study and I cannot find in the chart with a pulse oximetry has been done to see if this is been corrected.  Patient is sleeping 8 hours nightly and feels refreshed upon awakening.  Patient does not nap except for on weekends he does not feel this is necessary but does do this because he enjoys it.  Patient has an [place] score of 16/24.  Patient is having a large mask leak.  However, patient has never gotten new supplies and feels he has been using the supplies for approximately 1-1/2 years.  Discussed that possibility his symptoms and mask leak will get better with new equipment.  If it does not he can certainly contact me.  Is happy and wishes to continue therapy.        Current medications are:   Current Outpatient Medications:   •  albuterol (PROVENTIL HFA;VENTOLIN HFA) 108 (90 BASE) MCG/ACT inhaler, Inhale 2 puffs Every 4 (Four) Hours As Needed (for wheezing)., Disp: 3 inhaler, Rfl: 3  •  amLODIPine (NORVASC) 5 MG tablet, Take 1 tablet by mouth Daily., Disp: 90 tablet, Rfl: 3  •  aspirin 81 MG EC tablet, Take 81 mg by mouth Daily., Disp: , Rfl:   •  citalopram (CELEXA) 20 MG tablet, Take 1 tablet by mouth Daily., Disp: 90 tablet, Rfl: 3  •  fluocinonide-emollient (LIDEX-E) 0.05 % cream, Apply  topically 2 (Two) Times a Day., Disp: 60 g, Rfl: 2  •  fluticasone-salmeterol (ADVAIR) 250-50 MCG/DOSE DISKUS, Inhale 1 puff 2 (Two) Times a Day., Disp: 180 each, Rfl: 3  •  glimepiride (AMARYL) 4 MG tablet, Take 1 tablet by mouth Every Morning Before Breakfast., Disp: 90 tablet, Rfl: 3  •  lisinopril-hydrochlorothiazide (PRINZIDE,ZESTORETIC) 20-12.5 MG per tablet, Take 1 tablet by mouth Daily., Disp: 90 tablet, Rfl: 3  •   metFORMIN (GLUCOPHAGE) 1000 MG tablet, Take 1 tablet by mouth 2 (Two) Times a Day With Meals., Disp: 180 tablet, Rfl: 3  •  Triamcinolone Acetonide (NASACORT) 55 MCG/ACT nasal inhaler, 2 sprays into each nostril daily., Disp: , Rfl: .      The patient's relevant past medical, surgical, family and social history were reviewed and updated in Epic as appropriate.       Review of Systems   Eyes: Positive for visual disturbance.   Respiratory: Positive for apnea, shortness of breath and wheezing.    Cardiovascular: Positive for leg swelling.   Allergic/Immunologic: Positive for environmental allergies.   Psychiatric/Behavioral: Positive for sleep disturbance.   All other systems reviewed and are negative.        Objective:    Physical Exam   Constitutional: He is oriented to person, place, and time. He appears well-developed and well-nourished.   HENT:   Head: Normocephalic and atraumatic.   Mouth/Throat: Oropharynx is clear and moist.   Class 4 airway   Eyes: Conjunctivae are normal.   Neck: Neck supple. No thyromegaly present.   Cardiovascular: Normal rate and regular rhythm.   Pulmonary/Chest: Effort normal and breath sounds normal.   Lymphadenopathy:     He has no cervical adenopathy.   Neurological: He is alert and oriented to person, place, and time.   Skin: Skin is warm and dry.   Psychiatric: He has a normal mood and affect. His behavior is normal. Judgment and thought content normal.   Nursing note and vitals reviewed.  30/30 days of use.  Greater than 4-hour use 70%.  Maximum IPAP 25 minimum EPAP 16.  AHI 0.1.      ASSESSMENT/PLAN    Bryce was seen today for follow-up.    Diagnoses and all orders for this visit:    Nocturnal hypoxemia  -     Overnight Sleep Oximetry Study; Future    TIA (obstructive sleep apnea)  -     CPAP Therapy            1. Counseled patient regarding multimodal approach with healthy nutrition, healthy sleep, regular physical activity, social activities, counseling, and medications.  Encouraged to practice lateral  sleep position. Avoid alcohol and sedatives close to bedtime.  2. Oximetry ordered while on CPAP I will call patient with these results.  3. Refill orders x1 year.  Return to clinic 1 year or sooner if symptoms warrant.  I have reviewed the results of my evaluation and impression and discussed my recommendations in detail with the patient.      Signed by  Maricarmen Russo, JIE    August 29, 2019      CC: Samson Llanes MD          No ref. provider found

## 2024-10-11 DIAGNOSIS — M25.50 ARTHRALGIA OF MULTIPLE SITES: Primary | ICD-10-CM

## 2024-10-11 DIAGNOSIS — Z51.81 MEDICATION MONITORING ENCOUNTER: ICD-10-CM

## 2024-10-11 RX ORDER — METHOTREXATE 2.5 MG/1
TABLET ORAL
Qty: 72 TABLET | OUTPATIENT
Start: 2024-10-11

## 2024-10-14 NOTE — TELEPHONE ENCOUNTER
Lab order signed off on.  Please send the patient to do for monitoring on methotrexate prior to any refill and schedule patient for follow-up

## 2025-04-16 RX ORDER — FOLIC ACID 1 MG/1
1000 TABLET ORAL DAILY
Qty: 90 TABLET | OUTPATIENT
Start: 2025-04-16

## 2025-04-16 NOTE — TELEPHONE ENCOUNTER
Declining folic acid refill request. Patient needs appointment. Last seen 4/23/24 and no appointment scheduled.     HUB OK TO RELAY